# Patient Record
Sex: MALE | Race: WHITE | NOT HISPANIC OR LATINO | URBAN - METROPOLITAN AREA
[De-identification: names, ages, dates, MRNs, and addresses within clinical notes are randomized per-mention and may not be internally consistent; named-entity substitution may affect disease eponyms.]

---

## 2022-05-24 ENCOUNTER — OFFICE (OUTPATIENT)
Dept: URBAN - METROPOLITAN AREA CLINIC 10 | Facility: CLINIC | Age: 37
End: 2022-05-24
Payer: COMMERCIAL

## 2022-05-24 VITALS
DIASTOLIC BLOOD PRESSURE: 80 MMHG | WEIGHT: 200 LBS | SYSTOLIC BLOOD PRESSURE: 137 MMHG | OXYGEN SATURATION: 99 % | HEART RATE: 92 BPM | HEIGHT: 69 IN

## 2022-05-24 DIAGNOSIS — K92.1 MELENA: ICD-10-CM

## 2022-05-24 DIAGNOSIS — K60.2 ANAL FISSURE, UNSPECIFIED: ICD-10-CM

## 2022-05-24 DIAGNOSIS — R10.9 UNSPECIFIED ABDOMINAL PAIN: ICD-10-CM

## 2022-05-24 DIAGNOSIS — R19.7 DIARRHEA, UNSPECIFIED: ICD-10-CM

## 2022-05-24 PROCEDURE — 99204 OFFICE O/P NEW MOD 45 MIN: CPT | Performed by: PHYSICIAN ASSISTANT

## 2022-05-24 RX ORDER — HYOSCYAMINE SULFATE 0.12 MG/1
TABLET ORAL
Qty: 90 | Refills: 0 | Status: COMPLETED
Start: 2022-05-24 | End: 2022-06-21

## 2022-05-24 RX ORDER — TRAMADOL HYDROCHLORIDE 50 MG/1
TABLET, FILM COATED ORAL
Qty: 20 | Refills: 0 | Status: COMPLETED
Start: 2022-05-24 | End: 2022-06-21

## 2022-05-31 ENCOUNTER — INPATIENT HOSPITAL (OUTPATIENT)
Dept: URBAN - METROPOLITAN AREA HOSPITAL 93 | Facility: HOSPITAL | Age: 37
End: 2022-05-31
Payer: COMMERCIAL

## 2022-05-31 DIAGNOSIS — K52.9 NONINFECTIVE GASTROENTERITIS AND COLITIS, UNSPECIFIED: ICD-10-CM

## 2022-05-31 DIAGNOSIS — L40.50 ARTHROPATHIC PSORIASIS, UNSPECIFIED: ICD-10-CM

## 2022-05-31 DIAGNOSIS — R10.13 EPIGASTRIC PAIN: ICD-10-CM

## 2022-05-31 PROCEDURE — 99254 IP/OBS CNSLTJ NEW/EST MOD 60: CPT | Performed by: INTERNAL MEDICINE

## 2022-06-01 ENCOUNTER — INPATIENT HOSPITAL (OUTPATIENT)
Dept: URBAN - METROPOLITAN AREA HOSPITAL 93 | Facility: HOSPITAL | Age: 37
End: 2022-06-01
Payer: COMMERCIAL

## 2022-06-01 DIAGNOSIS — K52.9 NONINFECTIVE GASTROENTERITIS AND COLITIS, UNSPECIFIED: ICD-10-CM

## 2022-06-01 DIAGNOSIS — R10.13 EPIGASTRIC PAIN: ICD-10-CM

## 2022-06-01 PROCEDURE — 43239 EGD BIOPSY SINGLE/MULTIPLE: CPT | Performed by: INTERNAL MEDICINE

## 2022-06-01 PROCEDURE — 45331 SIGMOIDOSCOPY AND BIOPSY: CPT | Mod: 51 | Performed by: INTERNAL MEDICINE

## 2022-06-08 ENCOUNTER — INPATIENT HOSPITAL (OUTPATIENT)
Dept: URBAN - METROPOLITAN AREA HOSPITAL 93 | Facility: HOSPITAL | Age: 37
End: 2022-06-08
Payer: COMMERCIAL

## 2022-06-08 DIAGNOSIS — K60.2 ANAL FISSURE, UNSPECIFIED: ICD-10-CM

## 2022-06-08 DIAGNOSIS — K26.9 DUODENAL ULCER, UNSPECIFIED AS ACUTE OR CHRONIC, WITHOUT HEM: ICD-10-CM

## 2022-06-08 DIAGNOSIS — R10.13 EPIGASTRIC PAIN: ICD-10-CM

## 2022-06-08 DIAGNOSIS — K52.9 NONINFECTIVE GASTROENTERITIS AND COLITIS, UNSPECIFIED: ICD-10-CM

## 2022-06-08 PROCEDURE — 99222 1ST HOSP IP/OBS MODERATE 55: CPT | Performed by: INTERNAL MEDICINE

## 2022-06-09 ENCOUNTER — INPATIENT HOSPITAL (OUTPATIENT)
Dept: URBAN - METROPOLITAN AREA HOSPITAL 93 | Facility: HOSPITAL | Age: 37
End: 2022-06-09
Payer: COMMERCIAL

## 2022-06-09 DIAGNOSIS — K52.9 NONINFECTIVE GASTROENTERITIS AND COLITIS, UNSPECIFIED: ICD-10-CM

## 2022-06-09 DIAGNOSIS — K60.2 ANAL FISSURE, UNSPECIFIED: ICD-10-CM

## 2022-06-09 DIAGNOSIS — R10.13 EPIGASTRIC PAIN: ICD-10-CM

## 2022-06-09 DIAGNOSIS — K26.9 DUODENAL ULCER, UNSPECIFIED AS ACUTE OR CHRONIC, WITHOUT HEM: ICD-10-CM

## 2022-06-09 PROCEDURE — 99231 SBSQ HOSP IP/OBS SF/LOW 25: CPT | Performed by: INTERNAL MEDICINE

## 2022-06-10 ENCOUNTER — INPATIENT HOSPITAL (OUTPATIENT)
Dept: URBAN - METROPOLITAN AREA HOSPITAL 93 | Facility: HOSPITAL | Age: 37
End: 2022-06-10
Payer: COMMERCIAL

## 2022-06-10 DIAGNOSIS — K60.2 ANAL FISSURE, UNSPECIFIED: ICD-10-CM

## 2022-06-10 DIAGNOSIS — R10.13 EPIGASTRIC PAIN: ICD-10-CM

## 2022-06-10 DIAGNOSIS — K26.9 DUODENAL ULCER, UNSPECIFIED AS ACUTE OR CHRONIC, WITHOUT HEM: ICD-10-CM

## 2022-06-10 DIAGNOSIS — K52.9 NONINFECTIVE GASTROENTERITIS AND COLITIS, UNSPECIFIED: ICD-10-CM

## 2022-06-10 PROCEDURE — 99231 SBSQ HOSP IP/OBS SF/LOW 25: CPT | Performed by: INTERNAL MEDICINE

## 2022-06-11 ENCOUNTER — INPATIENT HOSPITAL (OUTPATIENT)
Dept: URBAN - METROPOLITAN AREA HOSPITAL 93 | Facility: HOSPITAL | Age: 37
End: 2022-06-11
Payer: COMMERCIAL

## 2022-06-11 DIAGNOSIS — K52.9 NONINFECTIVE GASTROENTERITIS AND COLITIS, UNSPECIFIED: ICD-10-CM

## 2022-06-11 DIAGNOSIS — K26.9 DUODENAL ULCER, UNSPECIFIED AS ACUTE OR CHRONIC, WITHOUT HEM: ICD-10-CM

## 2022-06-11 DIAGNOSIS — K60.2 ANAL FISSURE, UNSPECIFIED: ICD-10-CM

## 2022-06-11 DIAGNOSIS — R10.13 EPIGASTRIC PAIN: ICD-10-CM

## 2022-06-11 PROCEDURE — 99231 SBSQ HOSP IP/OBS SF/LOW 25: CPT | Performed by: INTERNAL MEDICINE

## 2022-06-12 ENCOUNTER — INPATIENT HOSPITAL (OUTPATIENT)
Dept: URBAN - METROPOLITAN AREA HOSPITAL 93 | Facility: HOSPITAL | Age: 37
End: 2022-06-12
Payer: COMMERCIAL

## 2022-06-12 DIAGNOSIS — R10.13 EPIGASTRIC PAIN: ICD-10-CM

## 2022-06-12 DIAGNOSIS — K60.2 ANAL FISSURE, UNSPECIFIED: ICD-10-CM

## 2022-06-12 DIAGNOSIS — K26.9 DUODENAL ULCER, UNSPECIFIED AS ACUTE OR CHRONIC, WITHOUT HEM: ICD-10-CM

## 2022-06-12 DIAGNOSIS — K52.9 NONINFECTIVE GASTROENTERITIS AND COLITIS, UNSPECIFIED: ICD-10-CM

## 2022-06-12 PROCEDURE — 99231 SBSQ HOSP IP/OBS SF/LOW 25: CPT | Performed by: INTERNAL MEDICINE

## 2022-06-13 ENCOUNTER — INPATIENT HOSPITAL (OUTPATIENT)
Dept: URBAN - METROPOLITAN AREA HOSPITAL 93 | Facility: HOSPITAL | Age: 37
End: 2022-06-13
Payer: COMMERCIAL

## 2022-06-13 DIAGNOSIS — K52.9 NONINFECTIVE GASTROENTERITIS AND COLITIS, UNSPECIFIED: ICD-10-CM

## 2022-06-13 DIAGNOSIS — K26.9 DUODENAL ULCER, UNSPECIFIED AS ACUTE OR CHRONIC, WITHOUT HEM: ICD-10-CM

## 2022-06-13 DIAGNOSIS — R10.13 EPIGASTRIC PAIN: ICD-10-CM

## 2022-06-13 DIAGNOSIS — L40.50 ARTHROPATHIC PSORIASIS, UNSPECIFIED: ICD-10-CM

## 2022-06-13 DIAGNOSIS — K60.2 ANAL FISSURE, UNSPECIFIED: ICD-10-CM

## 2022-06-13 PROCEDURE — 99231 SBSQ HOSP IP/OBS SF/LOW 25: CPT | Performed by: INTERNAL MEDICINE

## 2022-06-14 ENCOUNTER — INPATIENT HOSPITAL (OUTPATIENT)
Dept: URBAN - METROPOLITAN AREA HOSPITAL 93 | Facility: HOSPITAL | Age: 37
End: 2022-06-14
Payer: COMMERCIAL

## 2022-06-14 DIAGNOSIS — K60.2 ANAL FISSURE, UNSPECIFIED: ICD-10-CM

## 2022-06-14 DIAGNOSIS — K52.9 NONINFECTIVE GASTROENTERITIS AND COLITIS, UNSPECIFIED: ICD-10-CM

## 2022-06-14 DIAGNOSIS — K26.9 DUODENAL ULCER, UNSPECIFIED AS ACUTE OR CHRONIC, WITHOUT HEM: ICD-10-CM

## 2022-06-14 DIAGNOSIS — R10.13 EPIGASTRIC PAIN: ICD-10-CM

## 2022-06-14 PROCEDURE — 99231 SBSQ HOSP IP/OBS SF/LOW 25: CPT | Performed by: INTERNAL MEDICINE

## 2022-06-15 ENCOUNTER — INPATIENT HOSPITAL (OUTPATIENT)
Dept: URBAN - METROPOLITAN AREA HOSPITAL 93 | Facility: HOSPITAL | Age: 37
End: 2022-06-15
Payer: COMMERCIAL

## 2022-06-15 DIAGNOSIS — K26.9 DUODENAL ULCER, UNSPECIFIED AS ACUTE OR CHRONIC, WITHOUT HEM: ICD-10-CM

## 2022-06-15 DIAGNOSIS — R10.13 EPIGASTRIC PAIN: ICD-10-CM

## 2022-06-15 DIAGNOSIS — K52.9 NONINFECTIVE GASTROENTERITIS AND COLITIS, UNSPECIFIED: ICD-10-CM

## 2022-06-15 DIAGNOSIS — K60.2 ANAL FISSURE, UNSPECIFIED: ICD-10-CM

## 2022-06-15 DIAGNOSIS — L40.50 ARTHROPATHIC PSORIASIS, UNSPECIFIED: ICD-10-CM

## 2022-06-15 PROCEDURE — 99231 SBSQ HOSP IP/OBS SF/LOW 25: CPT | Performed by: INTERNAL MEDICINE

## 2022-06-21 ENCOUNTER — OFFICE (OUTPATIENT)
Dept: URBAN - METROPOLITAN AREA CLINIC 10 | Facility: CLINIC | Age: 37
End: 2022-06-21
Payer: COMMERCIAL

## 2022-06-21 VITALS
HEART RATE: 73 BPM | OXYGEN SATURATION: 98 % | HEIGHT: 69 IN | SYSTOLIC BLOOD PRESSURE: 126 MMHG | WEIGHT: 188 LBS | DIASTOLIC BLOOD PRESSURE: 79 MMHG

## 2022-06-21 DIAGNOSIS — K60.2 ANAL FISSURE, UNSPECIFIED: ICD-10-CM

## 2022-06-21 DIAGNOSIS — K50.10 CROHN'S DISEASE OF LARGE INTESTINE WITHOUT COMPLICATIONS: ICD-10-CM

## 2022-06-21 DIAGNOSIS — L40.50 ARTHROPATHIC PSORIASIS, UNSPECIFIED: ICD-10-CM

## 2022-06-21 DIAGNOSIS — K50.913 CROHN'S DISEASE, UNSPECIFIED, WITH FISTULA: ICD-10-CM

## 2022-06-21 LAB
CBC WITH DIFFERENTIAL/PLATELET: BASO (ABSOLUTE): 0.1 X10E3/UL (ref 0–0.2)
CBC WITH DIFFERENTIAL/PLATELET: BASOS: 0 %
CBC WITH DIFFERENTIAL/PLATELET: EOS (ABSOLUTE): 0 X10E3/UL (ref 0–0.4)
CBC WITH DIFFERENTIAL/PLATELET: EOS: 0 %
CBC WITH DIFFERENTIAL/PLATELET: HEMATOCRIT: 35.9 % — LOW (ref 37.5–51)
CBC WITH DIFFERENTIAL/PLATELET: HEMOGLOBIN: 11.5 G/DL — LOW (ref 13–17.7)
CBC WITH DIFFERENTIAL/PLATELET: IMMATURE GRANS (ABS): 0.1 X10E3/UL (ref 0–0.1)
CBC WITH DIFFERENTIAL/PLATELET: IMMATURE GRANULOCYTES: 1 %
CBC WITH DIFFERENTIAL/PLATELET: LYMPHS (ABSOLUTE): 1.5 X10E3/UL (ref 0.7–3.1)
CBC WITH DIFFERENTIAL/PLATELET: LYMPHS: 13 %
CBC WITH DIFFERENTIAL/PLATELET: MCH: 28.8 PG (ref 26.6–33)
CBC WITH DIFFERENTIAL/PLATELET: MCHC: 32 G/DL (ref 31.5–35.7)
CBC WITH DIFFERENTIAL/PLATELET: MCV: 90 FL (ref 79–97)
CBC WITH DIFFERENTIAL/PLATELET: MONOCYTES(ABSOLUTE): 0.6 X10E3/UL (ref 0.1–0.9)
CBC WITH DIFFERENTIAL/PLATELET: MONOCYTES: 5 %
CBC WITH DIFFERENTIAL/PLATELET: NEUTROPHILS (ABSOLUTE): 9.3 X10E3/UL — HIGH (ref 1.4–7)
CBC WITH DIFFERENTIAL/PLATELET: NEUTROPHILS: 81 %
CBC WITH DIFFERENTIAL/PLATELET: PLATELETS: 516 X10E3/UL — HIGH (ref 150–450)
CBC WITH DIFFERENTIAL/PLATELET: RBC: 3.99 X10E6/UL — LOW (ref 4.14–5.8)
CBC WITH DIFFERENTIAL/PLATELET: RDW: 12.2 % (ref 11.6–15.4)
CBC WITH DIFFERENTIAL/PLATELET: WBC: 11.6 X10E3/UL — HIGH (ref 3.4–10.8)
COMP. METABOLIC PANEL (14): A/G RATIO: 1.7 (ref 1.2–2.2)
COMP. METABOLIC PANEL (14): ALBUMIN: 4.3 G/DL (ref 4–5)
COMP. METABOLIC PANEL (14): ALKALINE PHOSPHATASE: 93 IU/L (ref 44–121)
COMP. METABOLIC PANEL (14): ALT (SGPT): 51 IU/L — HIGH (ref 0–44)
COMP. METABOLIC PANEL (14): AST (SGOT): 23 IU/L (ref 0–40)
COMP. METABOLIC PANEL (14): BILIRUBIN, TOTAL: 0.2 MG/DL (ref 0–1.2)
COMP. METABOLIC PANEL (14): BUN/CREATININE RATIO: 14 (ref 9–20)
COMP. METABOLIC PANEL (14): BUN: 10 MG/DL (ref 6–20)
COMP. METABOLIC PANEL (14): CALCIUM: 10 MG/DL (ref 8.7–10.2)
COMP. METABOLIC PANEL (14): CARBON DIOXIDE, TOTAL: 23 MMOL/L (ref 20–29)
COMP. METABOLIC PANEL (14): CHLORIDE: 103 MMOL/L (ref 96–106)
COMP. METABOLIC PANEL (14): CREATININE: 0.73 MG/DL — LOW (ref 0.76–1.27)
COMP. METABOLIC PANEL (14): EGFR: 119 ML/MIN/1.73 (ref 59–?)
COMP. METABOLIC PANEL (14): GLOBULIN, TOTAL: 2.5 G/DL (ref 1.5–4.5)
COMP. METABOLIC PANEL (14): GLUCOSE: 79 MG/DL (ref 65–99)
COMP. METABOLIC PANEL (14): POTASSIUM: 4.4 MMOL/L (ref 3.5–5.2)
COMP. METABOLIC PANEL (14): PROTEIN, TOTAL: 6.8 G/DL (ref 6–8.5)
COMP. METABOLIC PANEL (14): SODIUM: 139 MMOL/L (ref 134–144)
HBSAG SCREEN: NEGATIVE
HCV ANTIBODY: HEP C VIRUS AB: 0.1 S/CO RATIO (ref 0–0.9)
HEP A AB, TOTAL: NEGATIVE
HEP B SURFACE AB: HEP B SURFACE AB, QUAL: REACTIVE
QUANTIFERON-TB GOLD PLUS: NEGATIVE
QUANTIFERON-TB GOLD PLUS: QUANTIFERON CRITERIA: (no result)
QUANTIFERON-TB GOLD PLUS: QUANTIFERON INCUBATION: (no result)
QUANTIFERON-TB GOLD PLUS: QUANTIFERON MITOGEN VALUE: 8.89 IU/ML
QUANTIFERON-TB GOLD PLUS: QUANTIFERON NIL VALUE: 0.01 IU/ML
QUANTIFERON-TB GOLD PLUS: QUANTIFERON TB1 AG VALUE: 0 IU/ML
QUANTIFERON-TB GOLD PLUS: QUANTIFERON TB2 AG VALUE: 0.01 IU/ML

## 2022-06-21 PROCEDURE — 99214 OFFICE O/P EST MOD 30 MIN: CPT | Performed by: PHYSICIAN ASSISTANT

## 2022-06-21 RX ORDER — OXYCODONE HYDROCHLORIDE AND ACETAMINOPHEN 5; 325 MG/1; MG/1
TABLET ORAL
Qty: 15 | Refills: 0 | Status: COMPLETED
End: 2022-07-05

## 2022-06-21 NOTE — INTERFACERESULTNOTES
did discuss with patient on the telephone   When discussing biologic options.  He is in the process of getting Skyrizi

## 2022-07-05 ENCOUNTER — AMBULATORY SURGICAL CENTER (OUTPATIENT)
Dept: URBAN - METROPOLITAN AREA SURGERY 1 | Facility: SURGERY | Age: 37
End: 2022-07-05
Payer: COMMERCIAL

## 2022-07-05 ENCOUNTER — OFFICE (OUTPATIENT)
Dept: URBAN - METROPOLITAN AREA CLINIC 10 | Facility: CLINIC | Age: 37
End: 2022-07-05
Payer: COMMERCIAL

## 2022-07-05 VITALS
HEART RATE: 59 BPM | RESPIRATION RATE: 20 BRPM | SYSTOLIC BLOOD PRESSURE: 116 MMHG | DIASTOLIC BLOOD PRESSURE: 50 MMHG | HEART RATE: 70 BPM | OXYGEN SATURATION: 99 % | SYSTOLIC BLOOD PRESSURE: 112 MMHG | HEART RATE: 70 BPM | RESPIRATION RATE: 16 BRPM | TEMPERATURE: 97.3 F | RESPIRATION RATE: 21 BRPM | HEART RATE: 69 BPM | DIASTOLIC BLOOD PRESSURE: 73 MMHG | RESPIRATION RATE: 21 BRPM | OXYGEN SATURATION: 98 % | DIASTOLIC BLOOD PRESSURE: 73 MMHG | SYSTOLIC BLOOD PRESSURE: 133 MMHG | HEART RATE: 55 BPM | RESPIRATION RATE: 16 BRPM | RESPIRATION RATE: 20 BRPM | OXYGEN SATURATION: 97 % | HEART RATE: 59 BPM | HEIGHT: 69 IN | DIASTOLIC BLOOD PRESSURE: 81 MMHG | SYSTOLIC BLOOD PRESSURE: 151 MMHG | DIASTOLIC BLOOD PRESSURE: 81 MMHG | OXYGEN SATURATION: 99 % | RESPIRATION RATE: 18 BRPM | HEIGHT: 69 IN | DIASTOLIC BLOOD PRESSURE: 78 MMHG | DIASTOLIC BLOOD PRESSURE: 50 MMHG | DIASTOLIC BLOOD PRESSURE: 78 MMHG | RESPIRATION RATE: 20 BRPM | HEART RATE: 55 BPM | HEART RATE: 69 BPM | OXYGEN SATURATION: 97 % | HEART RATE: 69 BPM | HEIGHT: 69 IN | TEMPERATURE: 97.3 F | HEART RATE: 59 BPM | WEIGHT: 189 LBS | OXYGEN SATURATION: 98 % | RESPIRATION RATE: 16 BRPM | HEART RATE: 67 BPM | DIASTOLIC BLOOD PRESSURE: 62 MMHG | OXYGEN SATURATION: 98 % | HEART RATE: 67 BPM | HEART RATE: 55 BPM | SYSTOLIC BLOOD PRESSURE: 133 MMHG | DIASTOLIC BLOOD PRESSURE: 62 MMHG | RESPIRATION RATE: 18 BRPM | RESPIRATION RATE: 18 BRPM | WEIGHT: 189 LBS | SYSTOLIC BLOOD PRESSURE: 133 MMHG | HEART RATE: 65 BPM | HEART RATE: 67 BPM | RESPIRATION RATE: 21 BRPM | TEMPERATURE: 97.5 F | DIASTOLIC BLOOD PRESSURE: 81 MMHG | SYSTOLIC BLOOD PRESSURE: 151 MMHG | HEART RATE: 65 BPM | SYSTOLIC BLOOD PRESSURE: 112 MMHG | SYSTOLIC BLOOD PRESSURE: 116 MMHG | DIASTOLIC BLOOD PRESSURE: 50 MMHG | OXYGEN SATURATION: 97 % | DIASTOLIC BLOOD PRESSURE: 73 MMHG | SYSTOLIC BLOOD PRESSURE: 116 MMHG | SYSTOLIC BLOOD PRESSURE: 112 MMHG | DIASTOLIC BLOOD PRESSURE: 62 MMHG | TEMPERATURE: 97.3 F | TEMPERATURE: 97.5 F | WEIGHT: 189 LBS | SYSTOLIC BLOOD PRESSURE: 151 MMHG | DIASTOLIC BLOOD PRESSURE: 78 MMHG | HEART RATE: 65 BPM | TEMPERATURE: 97.5 F | HEART RATE: 70 BPM | OXYGEN SATURATION: 99 %

## 2022-07-05 DIAGNOSIS — K57.30 DIVERTICULOSIS OF LARGE INTESTINE WITHOUT PERFORATION OR ABS: ICD-10-CM

## 2022-07-05 DIAGNOSIS — K50.10 CROHN'S DISEASE OF LARGE INTESTINE WITHOUT COMPLICATIONS: ICD-10-CM

## 2022-07-05 PROBLEM — K63.89 OTHER SPECIFIED DISEASES OF INTESTINE: Status: ACTIVE | Noted: 2022-07-05

## 2022-07-05 PROBLEM — K52.89 INFLAMMATORY BOWEL DISEASE OF THE INTESTINE IF MORE PRECISE DIAGNOSIS OR DETERMINATION OF THE EXTENT/SEVERITY OF ACTIVITY OF DISEASE WILL INFLUENCE IMMEDIATE/FUTURE MANAGEMENT: Status: ACTIVE | Noted: 2022-07-05

## 2022-07-05 PROCEDURE — 45380 COLONOSCOPY AND BIOPSY: CPT | Performed by: INTERNAL MEDICINE

## 2022-07-05 PROCEDURE — 88342 IMHCHEM/IMCYTCHM 1ST ANTB: CPT | Performed by: INTERNAL MEDICINE

## 2022-07-05 PROCEDURE — 45380 COLONOSCOPY AND BIOPSY: CPT | Mod: SG | Performed by: INTERNAL MEDICINE

## 2022-07-05 PROCEDURE — 88305 TISSUE EXAM BY PATHOLOGIST: CPT | Performed by: INTERNAL MEDICINE

## 2022-07-05 NOTE — SERVICEHPINOTES
See recent outpatient visit note. br
br Mr. Crum is 36 years old. He is here for follow up of perianal Crohn's with Fistula.  He does have history of psoriatic arthritis previously taking Enbrel.  He has stopped that.  He was tried on Humira before that but had reactions so that was stopped.He was readmitted after 1st hospital admission with malaise and recurrent rectal pain. Exam under anesthesia revealed abscess which was drained by Dr. Coy and findings were typical for perianal Crohn's, seton was placed. His labs on 06/06/2022 showed white count 9.2, hematocrit 32 point platelet count 359. Plan was to coordinate with rheumatologist to change to Remicade and that perirectal abscess will need to be resolved before resuming bilateral therapy. In the meantime, patient was put on budesonide. CT of the pelvis with contrast on June 11, 2022 showed recurrent right perianal abscess which was more posterior midline compared to reference exam in measured 1.2 x 3.7 x 1.7 cm. No enterocutaneous component. Repeat lab 06/16/2022 showed WBC 8.6/hematocrit 30.6/MCV 89.5/platelets 423. Patient was taken back to the  noted to have pretty significant perianal Crohn's disease with new abscess and seton was placed after drainage of abscess. He was started on mesalamine also. His diarrhea improved as well as rectal pain.EGD and colonoscopy were performed at 1st admission. A nonbleeding ulcer was found in the duodenal bulb, 10 mm. Otherwise negative EGD. Sigmoidoscopy was performed which revealed anal fissure in the anal canal and erythema in the sigmoid colon which was biopsied showing lymphocytic colitis.He has been doing better lately. Has more formed stools but frequent stools. He took course of Augmentin. He is taking budesonide. He will be following up with Dr. Coy this week to discuss Seton removal. He does still have a good deal of rectal pain mainly in the morning although is better.

## 2022-07-22 ENCOUNTER — OFFICE (OUTPATIENT)
Dept: URBAN - METROPOLITAN AREA CLINIC 11 | Facility: CLINIC | Age: 37
End: 2022-07-22
Payer: COMMERCIAL

## 2022-07-22 VITALS
DIASTOLIC BLOOD PRESSURE: 77 MMHG | TEMPERATURE: 98.4 F | DIASTOLIC BLOOD PRESSURE: 76 MMHG | HEIGHT: 69 IN | HEART RATE: 69 BPM | RESPIRATION RATE: 18 BRPM | HEART RATE: 73 BPM | TEMPERATURE: 98.1 F | DIASTOLIC BLOOD PRESSURE: 89 MMHG | DIASTOLIC BLOOD PRESSURE: 83 MMHG | HEART RATE: 83 BPM | SYSTOLIC BLOOD PRESSURE: 119 MMHG | HEART RATE: 76 BPM | SYSTOLIC BLOOD PRESSURE: 123 MMHG | SYSTOLIC BLOOD PRESSURE: 128 MMHG | DIASTOLIC BLOOD PRESSURE: 96 MMHG | WEIGHT: 185.8 LBS | HEART RATE: 67 BPM | HEART RATE: 77 BPM | DIASTOLIC BLOOD PRESSURE: 84 MMHG | SYSTOLIC BLOOD PRESSURE: 139 MMHG | SYSTOLIC BLOOD PRESSURE: 130 MMHG

## 2022-07-22 DIAGNOSIS — K50.90 CROHN'S DISEASE, UNSPECIFIED, WITHOUT COMPLICATIONS: ICD-10-CM

## 2022-07-22 PROCEDURE — 96413 CHEMO IV INFUSION 1 HR: CPT | Performed by: INTERNAL MEDICINE

## 2022-07-22 PROCEDURE — 96415 CHEMO IV INFUSION ADDL HR: CPT | Performed by: INTERNAL MEDICINE

## 2022-07-22 NOTE — SERVICEHPINOTES
See follow up visit from  6/21/2022   .  brDate / Results of last TB test  6/21/2022   -   Negative  brLabs and/or Additional orders: CBC and CMP due in DecemberbrInsurance authorization:BCIL/AGUSTÍN Approved PA for new start [facility]-07/15/22-10/13/22 (SP)brPharmacy:  Specialty  brRate of Infusion:  Standard   
br   Infusion order placed on:  7/15/2022   
br

## 2022-07-22 NOTE — SERVICENOTES
Next Inflectra infusion is on 8/5/22 at 9:30
Patient observed for 15 minutes post infusion. 
Patient denies chest pain, shortness of breath or dizziness.  
Handout given and reviewed with patient.
Patient was instructed on common side effects.
Patient verbalized a complete understanding and has no questions.

## 2022-08-05 ENCOUNTER — OFFICE (OUTPATIENT)
Dept: URBAN - METROPOLITAN AREA CLINIC 11 | Facility: CLINIC | Age: 37
End: 2022-08-05
Payer: COMMERCIAL

## 2022-08-05 VITALS
SYSTOLIC BLOOD PRESSURE: 134 MMHG | SYSTOLIC BLOOD PRESSURE: 147 MMHG | HEIGHT: 69 IN | TEMPERATURE: 98.1 F | HEART RATE: 76 BPM | RESPIRATION RATE: 18 BRPM | HEART RATE: 64 BPM | SYSTOLIC BLOOD PRESSURE: 137 MMHG | TEMPERATURE: 98.2 F | DIASTOLIC BLOOD PRESSURE: 80 MMHG | DIASTOLIC BLOOD PRESSURE: 91 MMHG | DIASTOLIC BLOOD PRESSURE: 90 MMHG | HEART RATE: 70 BPM | DIASTOLIC BLOOD PRESSURE: 94 MMHG | HEART RATE: 73 BPM | WEIGHT: 191 LBS | DIASTOLIC BLOOD PRESSURE: 86 MMHG | DIASTOLIC BLOOD PRESSURE: 85 MMHG | HEART RATE: 66 BPM | SYSTOLIC BLOOD PRESSURE: 145 MMHG | SYSTOLIC BLOOD PRESSURE: 144 MMHG | SYSTOLIC BLOOD PRESSURE: 140 MMHG

## 2022-08-05 DIAGNOSIS — K50.90 CROHN'S DISEASE, UNSPECIFIED, WITHOUT COMPLICATIONS: ICD-10-CM

## 2022-08-05 PROCEDURE — 96413 CHEMO IV INFUSION 1 HR: CPT | Performed by: INTERNAL MEDICINE

## 2022-08-05 PROCEDURE — 96415 CHEMO IV INFUSION ADDL HR: CPT | Performed by: INTERNAL MEDICINE

## 2022-08-05 NOTE — SERVICEHPINOTES
See follow up visit from  6/21/2022   .  brDate / Results of last TB test  6/21/2022   -   Negative  brLabs and/or Additional orders: CBC &amp CMP due in December.brInsurance authorization: BCIL/AGUSTÍN Approved PA for new start [facility]-07/15/22-10/13/22.brPharmacy:  Specialty  brRate of Infusion:  Standard   
br   Infusion order placed on:  7/15/2022   
br

## 2022-09-23 ENCOUNTER — OFFICE (OUTPATIENT)
Dept: URBAN - METROPOLITAN AREA CLINIC 11 | Facility: CLINIC | Age: 37
End: 2022-09-23
Payer: COMMERCIAL

## 2022-09-23 VITALS
HEART RATE: 83 BPM | DIASTOLIC BLOOD PRESSURE: 80 MMHG | RESPIRATION RATE: 18 BRPM | HEART RATE: 82 BPM | SYSTOLIC BLOOD PRESSURE: 128 MMHG | DIASTOLIC BLOOD PRESSURE: 81 MMHG | TEMPERATURE: 97.3 F | SYSTOLIC BLOOD PRESSURE: 135 MMHG | DIASTOLIC BLOOD PRESSURE: 83 MMHG | HEIGHT: 69 IN | TEMPERATURE: 97.4 F | HEART RATE: 79 BPM | HEART RATE: 76 BPM | SYSTOLIC BLOOD PRESSURE: 132 MMHG | DIASTOLIC BLOOD PRESSURE: 86 MMHG

## 2022-09-23 DIAGNOSIS — K50.90 CROHN'S DISEASE, UNSPECIFIED, WITHOUT COMPLICATIONS: ICD-10-CM

## 2022-09-23 PROCEDURE — 96413 CHEMO IV INFUSION 1 HR: CPT | Performed by: INTERNAL MEDICINE

## 2022-09-23 NOTE — SERVICEHPINOTES
See follow up visit from  6/21/2022   .  brDate / Results of last TB test  6/21/2022   -   Negative  brLabs and/or Additional orders:CBC &amp CMP due in December:CMP due at third Skyrizi Infusion   brInsurance authorization: AGUSTÍN Approved PA for Skrizi IV loading doses-09/12/22-10/13/22 (SP) task sent for refill of Skyrizi brPharmacy:  Specialty  brRate of Infusion:  1 hour   
br   Infusion order placed on:  9/12/2022   
br

## 2022-09-23 NOTE — SERVICENOTES
Patient observed for 15 minutes post infusion. 
Patient denies chest pain, shortness of breath or dizziness.  
Handout given and reviewed with patient.
Patient was instructed on common side effects.
Patient verbalized a complete understanding and has no questions.

Your next Skyrizi infusion is on 10/21/22 at 0830am

Your next Skyrizi infusion is on 11/18/22  at 0830 am

## 2022-10-21 ENCOUNTER — OFFICE (OUTPATIENT)
Dept: URBAN - METROPOLITAN AREA CLINIC 11 | Facility: CLINIC | Age: 37
End: 2022-10-21
Payer: COMMERCIAL

## 2022-10-21 VITALS
HEIGHT: 69 IN | RESPIRATION RATE: 18 BRPM | DIASTOLIC BLOOD PRESSURE: 82 MMHG | TEMPERATURE: 98.1 F | SYSTOLIC BLOOD PRESSURE: 139 MMHG | SYSTOLIC BLOOD PRESSURE: 130 MMHG | DIASTOLIC BLOOD PRESSURE: 80 MMHG | SYSTOLIC BLOOD PRESSURE: 123 MMHG | HEART RATE: 81 BPM | SYSTOLIC BLOOD PRESSURE: 113 MMHG | DIASTOLIC BLOOD PRESSURE: 72 MMHG | DIASTOLIC BLOOD PRESSURE: 75 MMHG | HEART RATE: 83 BPM | HEART RATE: 89 BPM | TEMPERATURE: 98.2 F

## 2022-10-21 DIAGNOSIS — K50.90 CROHN'S DISEASE, UNSPECIFIED, WITHOUT COMPLICATIONS: ICD-10-CM

## 2022-10-21 PROCEDURE — 96413 CHEMO IV INFUSION 1 HR: CPT | Performed by: INTERNAL MEDICINE

## 2022-10-21 NOTE — SERVICEHPINOTES
See follow up visit from  6/21/2022   .  brDate / Results of last TB test  6/21/2022   -   Negative  brLabs and/or Additional orders: CBC &amp CMP due in December. CMP at next infusion.brInsurance authorization: AGUSTÍN Approved PA for Skrizi IV loading doses-09/12/22-10/13/22. Specialty medicine is here.brPharmacy:  Specialty  brRate of Infusion:  Standard   
br   Infusion order placed on:  9/12/2022   
br

## 2022-10-28 ENCOUNTER — OFFICE (OUTPATIENT)
Dept: URBAN - METROPOLITAN AREA CLINIC 10 | Facility: CLINIC | Age: 37
End: 2022-10-28
Payer: COMMERCIAL

## 2022-10-28 VITALS
DIASTOLIC BLOOD PRESSURE: 90 MMHG | OXYGEN SATURATION: 98 % | WEIGHT: 201 LBS | SYSTOLIC BLOOD PRESSURE: 127 MMHG | HEART RATE: 80 BPM | HEIGHT: 69 IN

## 2022-10-28 DIAGNOSIS — E34.9 ENDOCRINE DISORDER, UNSPECIFIED: ICD-10-CM

## 2022-10-28 DIAGNOSIS — R53.83 OTHER FATIGUE: ICD-10-CM

## 2022-10-28 DIAGNOSIS — L40.50 ARTHROPATHIC PSORIASIS, UNSPECIFIED: ICD-10-CM

## 2022-10-28 DIAGNOSIS — K50.10 CROHN'S DISEASE OF LARGE INTESTINE WITHOUT COMPLICATIONS: ICD-10-CM

## 2022-10-28 LAB
C-REACTIVE PROTEIN, QUANT: <1 MG/L
CBC WITH DIFFERENTIAL/PLATELET: BASO (ABSOLUTE): 0 X10E3/UL (ref 0–0.2)
CBC WITH DIFFERENTIAL/PLATELET: BASOS: 1 %
CBC WITH DIFFERENTIAL/PLATELET: EOS (ABSOLUTE): 0 X10E3/UL (ref 0–0.4)
CBC WITH DIFFERENTIAL/PLATELET: EOS: 0 %
CBC WITH DIFFERENTIAL/PLATELET: HEMATOCRIT: 44.8 % (ref 37.5–51)
CBC WITH DIFFERENTIAL/PLATELET: HEMOGLOBIN: 15.2 G/DL (ref 13–17.7)
CBC WITH DIFFERENTIAL/PLATELET: IMMATURE GRANS (ABS): 0 X10E3/UL (ref 0–0.1)
CBC WITH DIFFERENTIAL/PLATELET: IMMATURE GRANULOCYTES: 0 %
CBC WITH DIFFERENTIAL/PLATELET: LYMPHS (ABSOLUTE): 2.2 X10E3/UL (ref 0.7–3.1)
CBC WITH DIFFERENTIAL/PLATELET: LYMPHS: 32 %
CBC WITH DIFFERENTIAL/PLATELET: MCH: 31.5 PG (ref 26.6–33)
CBC WITH DIFFERENTIAL/PLATELET: MCHC: 33.9 G/DL (ref 31.5–35.7)
CBC WITH DIFFERENTIAL/PLATELET: MCV: 93 FL (ref 79–97)
CBC WITH DIFFERENTIAL/PLATELET: MONOCYTES(ABSOLUTE): 0.6 X10E3/UL (ref 0.1–0.9)
CBC WITH DIFFERENTIAL/PLATELET: MONOCYTES: 8 %
CBC WITH DIFFERENTIAL/PLATELET: NEUTROPHILS (ABSOLUTE): 4.2 X10E3/UL (ref 1.4–7)
CBC WITH DIFFERENTIAL/PLATELET: NEUTROPHILS: 59 %
CBC WITH DIFFERENTIAL/PLATELET: PLATELETS: 277 X10E3/UL (ref 150–450)
CBC WITH DIFFERENTIAL/PLATELET: RBC: 4.83 X10E6/UL (ref 4.14–5.8)
CBC WITH DIFFERENTIAL/PLATELET: RDW: 12.1 % (ref 11.6–15.4)
CBC WITH DIFFERENTIAL/PLATELET: WBC: 7.1 X10E3/UL (ref 3.4–10.8)
COMP. METABOLIC PANEL (14): A/G RATIO: 1.8 (ref 1.2–2.2)
COMP. METABOLIC PANEL (14): ALBUMIN: 4.4 G/DL (ref 4–5)
COMP. METABOLIC PANEL (14): ALKALINE PHOSPHATASE: 47 IU/L (ref 44–121)
COMP. METABOLIC PANEL (14): ALT (SGPT): 37 IU/L (ref 0–44)
COMP. METABOLIC PANEL (14): AST (SGOT): 24 IU/L (ref 0–40)
COMP. METABOLIC PANEL (14): BILIRUBIN, TOTAL: 0.6 MG/DL (ref 0–1.2)
COMP. METABOLIC PANEL (14): BUN/CREATININE RATIO: 11 (ref 9–20)
COMP. METABOLIC PANEL (14): BUN: 10 MG/DL (ref 6–20)
COMP. METABOLIC PANEL (14): CALCIUM: 9.4 MG/DL (ref 8.7–10.2)
COMP. METABOLIC PANEL (14): CARBON DIOXIDE, TOTAL: 23 MMOL/L (ref 20–29)
COMP. METABOLIC PANEL (14): CHLORIDE: 104 MMOL/L (ref 96–106)
COMP. METABOLIC PANEL (14): CREATININE: 0.93 MG/DL (ref 0.76–1.27)
COMP. METABOLIC PANEL (14): EGFR: 108 ML/MIN/1.73 (ref 59–?)
COMP. METABOLIC PANEL (14): GLOBULIN, TOTAL: 2.4 G/DL (ref 1.5–4.5)
COMP. METABOLIC PANEL (14): GLUCOSE: 82 MG/DL (ref 70–99)
COMP. METABOLIC PANEL (14): POTASSIUM: 4 MMOL/L (ref 3.5–5.2)
COMP. METABOLIC PANEL (14): PROTEIN, TOTAL: 6.8 G/DL (ref 6–8.5)
COMP. METABOLIC PANEL (14): SODIUM: 142 MMOL/L (ref 134–144)
Lab: 6.5 PG/ML — LOW (ref 8.7–25.1)
SEX HORM BINDING GLOB, SERUM: 24 NMOL/L (ref 16.5–55.9)
TESTOSTERONE: 334 NG/DL (ref 264–916)
VITAMIN B12 AND FOLATE: FOLATE (FOLIC ACID), SERUM: 12 NG/ML (ref 3–?)
VITAMIN B12 AND FOLATE: VITAMIN B12: 420 PG/ML (ref 232–1245)
VITAMIN D, 25-HYDROXY: 26.1 NG/ML — LOW (ref 30–100)

## 2022-10-28 PROCEDURE — 99214 OFFICE O/P EST MOD 30 MIN: CPT | Performed by: PHYSICIAN ASSISTANT

## 2022-11-02 LAB
C-REACTIVE PROTEIN, QUANT: <1 MG/L
CBC WITH DIFFERENTIAL/PLATELET: BASO (ABSOLUTE): 0 X10E3/UL (ref 0–0.2)
CBC WITH DIFFERENTIAL/PLATELET: BASOS: 1 %
CBC WITH DIFFERENTIAL/PLATELET: EOS (ABSOLUTE): 0 X10E3/UL (ref 0–0.4)
CBC WITH DIFFERENTIAL/PLATELET: EOS: 0 %
CBC WITH DIFFERENTIAL/PLATELET: HEMATOCRIT: 44.8 % (ref 37.5–51)
CBC WITH DIFFERENTIAL/PLATELET: HEMATOLOGY COMMENTS: (no result)
CBC WITH DIFFERENTIAL/PLATELET: HEMOGLOBIN: 15.2 G/DL (ref 13–17.7)
CBC WITH DIFFERENTIAL/PLATELET: IMMATURE CELLS: (no result)
CBC WITH DIFFERENTIAL/PLATELET: IMMATURE GRANS (ABS): 0 X10E3/UL (ref 0–0.1)
CBC WITH DIFFERENTIAL/PLATELET: IMMATURE GRANULOCYTES: 0 %
CBC WITH DIFFERENTIAL/PLATELET: LYMPHS (ABSOLUTE): 2.2 X10E3/UL (ref 0.7–3.1)
CBC WITH DIFFERENTIAL/PLATELET: LYMPHS: 32 %
CBC WITH DIFFERENTIAL/PLATELET: MCH: 31.5 PG (ref 26.6–33)
CBC WITH DIFFERENTIAL/PLATELET: MCHC: 33.9 G/DL (ref 31.5–35.7)
CBC WITH DIFFERENTIAL/PLATELET: MCV: 93 FL (ref 79–97)
CBC WITH DIFFERENTIAL/PLATELET: MONOCYTES(ABSOLUTE): 0.6 X10E3/UL (ref 0.1–0.9)
CBC WITH DIFFERENTIAL/PLATELET: MONOCYTES: 8 %
CBC WITH DIFFERENTIAL/PLATELET: NEUTROPHILS (ABSOLUTE): 4.2 X10E3/UL (ref 1.4–7)
CBC WITH DIFFERENTIAL/PLATELET: NEUTROPHILS: 59 %
CBC WITH DIFFERENTIAL/PLATELET: NRBC: (no result)
CBC WITH DIFFERENTIAL/PLATELET: PLATELETS: 277 X10E3/UL (ref 150–450)
CBC WITH DIFFERENTIAL/PLATELET: RBC: 4.83 X10E6/UL (ref 4.14–5.8)
CBC WITH DIFFERENTIAL/PLATELET: RDW: 12.1 % (ref 11.6–15.4)
CBC WITH DIFFERENTIAL/PLATELET: WBC: 7.1 X10E3/UL (ref 3.4–10.8)
COMP. METABOLIC PANEL (14): A/G RATIO: 1.8 (ref 1.2–2.2)
COMP. METABOLIC PANEL (14): ALBUMIN: 4.4 G/DL (ref 4–5)
COMP. METABOLIC PANEL (14): ALKALINE PHOSPHATASE: 47 IU/L (ref 44–121)
COMP. METABOLIC PANEL (14): ALT (SGPT): 37 IU/L (ref 0–44)
COMP. METABOLIC PANEL (14): AST (SGOT): 24 IU/L (ref 0–40)
COMP. METABOLIC PANEL (14): BILIRUBIN, TOTAL: 0.6 MG/DL (ref 0–1.2)
COMP. METABOLIC PANEL (14): BUN/CREATININE RATIO: 11 (ref 9–20)
COMP. METABOLIC PANEL (14): BUN: 10 MG/DL (ref 6–20)
COMP. METABOLIC PANEL (14): CALCIUM: 9.4 MG/DL (ref 8.7–10.2)
COMP. METABOLIC PANEL (14): CARBON DIOXIDE, TOTAL: 23 MMOL/L (ref 20–29)
COMP. METABOLIC PANEL (14): CHLORIDE: 104 MMOL/L (ref 96–106)
COMP. METABOLIC PANEL (14): CREATININE: 0.93 MG/DL (ref 0.76–1.27)
COMP. METABOLIC PANEL (14): EGFR: 108 ML/MIN/1.73 (ref 59–?)
COMP. METABOLIC PANEL (14): GLOBULIN, TOTAL: 2.4 G/DL (ref 1.5–4.5)
COMP. METABOLIC PANEL (14): GLUCOSE: 82 MG/DL (ref 70–99)
COMP. METABOLIC PANEL (14): POTASSIUM: 4 MMOL/L (ref 3.5–5.2)
COMP. METABOLIC PANEL (14): PROTEIN, TOTAL: 6.8 G/DL (ref 6–8.5)
COMP. METABOLIC PANEL (14): SODIUM: 142 MMOL/L (ref 134–144)
Lab: 6.5 PG/ML — LOW (ref 8.7–25.1)
SEX HORM BINDING GLOB, SERUM: 24 NMOL/L (ref 16.5–55.9)
TESTOSTERONE: 334 NG/DL (ref 264–916)
VITAMIN B12 AND FOLATE: FOLATE (FOLIC ACID), SERUM: 12 NG/ML (ref 3–?)
VITAMIN B12 AND FOLATE: VITAMIN B12: 420 PG/ML (ref 232–1245)
VITAMIN D, 25-HYDROXY: 26.1 NG/ML — LOW (ref 30–100)

## 2022-11-18 ENCOUNTER — OFFICE (OUTPATIENT)
Dept: URBAN - METROPOLITAN AREA CLINIC 11 | Facility: CLINIC | Age: 37
End: 2022-11-18
Payer: COMMERCIAL

## 2022-11-18 VITALS
RESPIRATION RATE: 18 BRPM | DIASTOLIC BLOOD PRESSURE: 78 MMHG | SYSTOLIC BLOOD PRESSURE: 138 MMHG | DIASTOLIC BLOOD PRESSURE: 90 MMHG | SYSTOLIC BLOOD PRESSURE: 145 MMHG | DIASTOLIC BLOOD PRESSURE: 85 MMHG | HEART RATE: 83 BPM | TEMPERATURE: 98.6 F | HEIGHT: 69 IN | DIASTOLIC BLOOD PRESSURE: 89 MMHG | SYSTOLIC BLOOD PRESSURE: 131 MMHG | HEART RATE: 69 BPM | TEMPERATURE: 97.3 F | HEART RATE: 65 BPM

## 2022-11-18 DIAGNOSIS — K50.90 CROHN'S DISEASE, UNSPECIFIED, WITHOUT COMPLICATIONS: ICD-10-CM

## 2022-11-18 PROCEDURE — 96413 CHEMO IV INFUSION 1 HR: CPT | Performed by: INTERNAL MEDICINE

## 2022-11-18 NOTE — SERVICEHPINOTES
See follow up visit from  10/28/2022   .  brDate / Results of last TB test  6/21/2022   -   Negative  brLabs and/or Additional orders: CBC &amp CMP in April:CMP drawn today brInsurance authorization: AGUSTÍN DUMONT for Alexandra IV-10/24/22-11/23/22 (SP)brPharmacy:  Specialty  brRate of Infusion:  1 hour   
br   Infusion order placed on:  9/12/2022   
br

## 2022-12-16 ENCOUNTER — OFFICE (OUTPATIENT)
Dept: URBAN - METROPOLITAN AREA CLINIC 11 | Facility: CLINIC | Age: 37
End: 2022-12-16

## 2022-12-16 VITALS
HEART RATE: 91 BPM | HEIGHT: 69 IN | SYSTOLIC BLOOD PRESSURE: 148 MMHG | DIASTOLIC BLOOD PRESSURE: 92 MMHG | OXYGEN SATURATION: 99 %

## 2022-12-16 DIAGNOSIS — K50.10 CROHN'S DISEASE OF LARGE INTESTINE WITHOUT COMPLICATIONS: ICD-10-CM

## 2022-12-16 PROCEDURE — 99214 OFFICE O/P EST MOD 30 MIN: CPT

## 2023-04-06 ENCOUNTER — OFFICE (OUTPATIENT)
Dept: URBAN - METROPOLITAN AREA CLINIC 10 | Facility: CLINIC | Age: 38
End: 2023-04-06

## 2023-04-06 VITALS
HEIGHT: 69 IN | HEART RATE: 79 BPM | WEIGHT: 204 LBS | OXYGEN SATURATION: 99 % | DIASTOLIC BLOOD PRESSURE: 85 MMHG | SYSTOLIC BLOOD PRESSURE: 126 MMHG

## 2023-04-06 DIAGNOSIS — K50.90 CROHN'S DISEASE, UNSPECIFIED, WITHOUT COMPLICATIONS: ICD-10-CM

## 2023-04-06 PROCEDURE — 99214 OFFICE O/P EST MOD 30 MIN: CPT | Performed by: INTERNAL MEDICINE

## 2023-06-22 ENCOUNTER — OFFICE (OUTPATIENT)
Dept: URBAN - METROPOLITAN AREA CLINIC 10 | Facility: CLINIC | Age: 38
End: 2023-06-22
Payer: COMMERCIAL

## 2023-06-22 VITALS
DIASTOLIC BLOOD PRESSURE: 96 MMHG | OXYGEN SATURATION: 99 % | HEART RATE: 73 BPM | HEIGHT: 69 IN | WEIGHT: 203 LBS | SYSTOLIC BLOOD PRESSURE: 142 MMHG

## 2023-06-22 DIAGNOSIS — L30.9 DERMATITIS, UNSPECIFIED: ICD-10-CM

## 2023-06-22 DIAGNOSIS — K50.90 CROHN'S DISEASE, UNSPECIFIED, WITHOUT COMPLICATIONS: ICD-10-CM

## 2023-06-22 DIAGNOSIS — L40.50 ARTHROPATHIC PSORIASIS, UNSPECIFIED: ICD-10-CM

## 2023-06-22 PROCEDURE — 99214 OFFICE O/P EST MOD 30 MIN: CPT | Performed by: PHYSICIAN ASSISTANT

## 2023-06-23 LAB
CBC WITH DIFFERENTIAL/PLATELET: BASO (ABSOLUTE): 0.1 X10E3/UL (ref 0–0.2)
CBC WITH DIFFERENTIAL/PLATELET: BASOS: 1 %
CBC WITH DIFFERENTIAL/PLATELET: EOS (ABSOLUTE): 0.2 X10E3/UL (ref 0–0.4)
CBC WITH DIFFERENTIAL/PLATELET: EOS: 3 %
CBC WITH DIFFERENTIAL/PLATELET: HEMATOCRIT: 43.1 % (ref 37.5–51)
CBC WITH DIFFERENTIAL/PLATELET: HEMOGLOBIN: 15.2 G/DL (ref 13–17.7)
CBC WITH DIFFERENTIAL/PLATELET: IMMATURE GRANS (ABS): 0 X10E3/UL (ref 0–0.1)
CBC WITH DIFFERENTIAL/PLATELET: IMMATURE GRANULOCYTES: 1 %
CBC WITH DIFFERENTIAL/PLATELET: LYMPHS (ABSOLUTE): 1.2 X10E3/UL (ref 0.7–3.1)
CBC WITH DIFFERENTIAL/PLATELET: LYMPHS: 25 %
CBC WITH DIFFERENTIAL/PLATELET: MCH: 31.9 PG (ref 26.6–33)
CBC WITH DIFFERENTIAL/PLATELET: MCHC: 35.3 G/DL (ref 31.5–35.7)
CBC WITH DIFFERENTIAL/PLATELET: MCV: 91 FL (ref 79–97)
CBC WITH DIFFERENTIAL/PLATELET: MONOCYTES(ABSOLUTE): 0.5 X10E3/UL (ref 0.1–0.9)
CBC WITH DIFFERENTIAL/PLATELET: MONOCYTES: 11 %
CBC WITH DIFFERENTIAL/PLATELET: NEUTROPHILS (ABSOLUTE): 2.9 X10E3/UL (ref 1.4–7)
CBC WITH DIFFERENTIAL/PLATELET: NEUTROPHILS: 59 %
CBC WITH DIFFERENTIAL/PLATELET: PLATELETS: 339 X10E3/UL (ref 150–450)
CBC WITH DIFFERENTIAL/PLATELET: RBC: 4.76 X10E6/UL (ref 4.14–5.8)
CBC WITH DIFFERENTIAL/PLATELET: RDW: 12.6 % (ref 11.6–15.4)
CBC WITH DIFFERENTIAL/PLATELET: WBC: 5 X10E3/UL (ref 3.4–10.8)
COMP. METABOLIC PANEL (14): A/G RATIO: 2.2 (ref 1.2–2.2)
COMP. METABOLIC PANEL (14): ALBUMIN: 4.8 G/DL (ref 4–5)
COMP. METABOLIC PANEL (14): ALKALINE PHOSPHATASE: 67 IU/L (ref 44–121)
COMP. METABOLIC PANEL (14): ALT (SGPT): 59 IU/L — HIGH (ref 0–44)
COMP. METABOLIC PANEL (14): AST (SGOT): 35 IU/L (ref 0–40)
COMP. METABOLIC PANEL (14): BILIRUBIN, TOTAL: 1 MG/DL (ref 0–1.2)
COMP. METABOLIC PANEL (14): BUN/CREATININE RATIO: 7 — LOW (ref 9–20)
COMP. METABOLIC PANEL (14): BUN: 7 MG/DL (ref 6–20)
COMP. METABOLIC PANEL (14): CALCIUM: 9.8 MG/DL (ref 8.7–10.2)
COMP. METABOLIC PANEL (14): CARBON DIOXIDE, TOTAL: 21 MMOL/L (ref 20–29)
COMP. METABOLIC PANEL (14): CHLORIDE: 105 MMOL/L (ref 96–106)
COMP. METABOLIC PANEL (14): CREATININE: 0.98 MG/DL (ref 0.76–1.27)
COMP. METABOLIC PANEL (14): EGFR: 101 ML/MIN/1.73 (ref 59–?)
COMP. METABOLIC PANEL (14): GLOBULIN, TOTAL: 2.2 G/DL (ref 1.5–4.5)
COMP. METABOLIC PANEL (14): GLUCOSE: 91 MG/DL (ref 70–99)
COMP. METABOLIC PANEL (14): POTASSIUM: 4.4 MMOL/L (ref 3.5–5.2)
COMP. METABOLIC PANEL (14): PROTEIN, TOTAL: 7 G/DL (ref 6–8.5)
COMP. METABOLIC PANEL (14): SODIUM: 141 MMOL/L (ref 134–144)

## 2023-08-03 ENCOUNTER — OFFICE (OUTPATIENT)
Dept: URBAN - METROPOLITAN AREA CLINIC 10 | Facility: CLINIC | Age: 38
End: 2023-08-03
Payer: COMMERCIAL

## 2023-08-03 VITALS
SYSTOLIC BLOOD PRESSURE: 144 MMHG | OXYGEN SATURATION: 96 % | HEART RATE: 93 BPM | DIASTOLIC BLOOD PRESSURE: 91 MMHG | HEIGHT: 69 IN | WEIGHT: 202 LBS

## 2023-08-03 DIAGNOSIS — L02.215 CUTANEOUS ABSCESS OF PERINEUM: ICD-10-CM

## 2023-08-03 DIAGNOSIS — L30.9 DERMATITIS, UNSPECIFIED: ICD-10-CM

## 2023-08-03 DIAGNOSIS — K50.90 CROHN'S DISEASE, UNSPECIFIED, WITHOUT COMPLICATIONS: ICD-10-CM

## 2023-08-03 PROCEDURE — 99214 OFFICE O/P EST MOD 30 MIN: CPT | Performed by: PHYSICIAN ASSISTANT

## 2023-08-03 RX ORDER — NYSTATIN 100000 [USP'U]/G
POWDER TOPICAL
Qty: 1 | Refills: 2 | Status: ACTIVE
Start: 2023-08-03

## 2023-08-03 RX ORDER — AMOXICILLIN AND CLAVULANATE POTASSIUM 875; 125 MG/1; MG/1
TABLET, FILM COATED ORAL
Qty: 10 | Refills: 0 | Status: COMPLETED
Start: 2023-08-03 | End: 2023-12-07

## 2023-08-04 LAB
C-REACTIVE PROTEIN, QUANT: <1 MG/L
CBC WITH DIFFERENTIAL/PLATELET: BASO (ABSOLUTE): 0.1 X10E3/UL (ref 0–0.2)
CBC WITH DIFFERENTIAL/PLATELET: BASOS: 1 %
CBC WITH DIFFERENTIAL/PLATELET: EOS (ABSOLUTE): 0.2 X10E3/UL (ref 0–0.4)
CBC WITH DIFFERENTIAL/PLATELET: EOS: 5 %
CBC WITH DIFFERENTIAL/PLATELET: HEMATOCRIT: 47.2 % (ref 37.5–51)
CBC WITH DIFFERENTIAL/PLATELET: HEMOGLOBIN: 16.2 G/DL (ref 13–17.7)
CBC WITH DIFFERENTIAL/PLATELET: IMMATURE GRANS (ABS): 0 X10E3/UL (ref 0–0.1)
CBC WITH DIFFERENTIAL/PLATELET: IMMATURE GRANULOCYTES: 0 %
CBC WITH DIFFERENTIAL/PLATELET: LYMPHS (ABSOLUTE): 1.4 X10E3/UL (ref 0.7–3.1)
CBC WITH DIFFERENTIAL/PLATELET: LYMPHS: 30 %
CBC WITH DIFFERENTIAL/PLATELET: MCH: 31.5 PG (ref 26.6–33)
CBC WITH DIFFERENTIAL/PLATELET: MCHC: 34.3 G/DL (ref 31.5–35.7)
CBC WITH DIFFERENTIAL/PLATELET: MCV: 92 FL (ref 79–97)
CBC WITH DIFFERENTIAL/PLATELET: MONOCYTES(ABSOLUTE): 0.4 X10E3/UL (ref 0.1–0.9)
CBC WITH DIFFERENTIAL/PLATELET: MONOCYTES: 9 %
CBC WITH DIFFERENTIAL/PLATELET: NEUTROPHILS (ABSOLUTE): 2.4 X10E3/UL (ref 1.4–7)
CBC WITH DIFFERENTIAL/PLATELET: NEUTROPHILS: 55 %
CBC WITH DIFFERENTIAL/PLATELET: PLATELETS: 262 X10E3/UL (ref 150–450)
CBC WITH DIFFERENTIAL/PLATELET: RBC: 5.15 X10E6/UL (ref 4.14–5.8)
CBC WITH DIFFERENTIAL/PLATELET: RDW: 12.8 % (ref 11.6–15.4)
CBC WITH DIFFERENTIAL/PLATELET: WBC: 4.5 X10E3/UL (ref 3.4–10.8)
COMP. METABOLIC PANEL (14): A/G RATIO: 2 (ref 1.2–2.2)
COMP. METABOLIC PANEL (14): ALBUMIN: 4.8 G/DL (ref 4.1–5.1)
COMP. METABOLIC PANEL (14): ALKALINE PHOSPHATASE: 56 IU/L (ref 44–121)
COMP. METABOLIC PANEL (14): ALT (SGPT): 35 IU/L (ref 0–44)
COMP. METABOLIC PANEL (14): AST (SGOT): 23 IU/L (ref 0–40)
COMP. METABOLIC PANEL (14): BILIRUBIN, TOTAL: 0.8 MG/DL (ref 0–1.2)
COMP. METABOLIC PANEL (14): BUN/CREATININE RATIO: 9 (ref 9–20)
COMP. METABOLIC PANEL (14): BUN: 9 MG/DL (ref 6–20)
COMP. METABOLIC PANEL (14): CALCIUM: 10 MG/DL (ref 8.7–10.2)
COMP. METABOLIC PANEL (14): CARBON DIOXIDE, TOTAL: 22 MMOL/L (ref 20–29)
COMP. METABOLIC PANEL (14): CHLORIDE: 106 MMOL/L (ref 96–106)
COMP. METABOLIC PANEL (14): CREATININE: 0.98 MG/DL (ref 0.76–1.27)
COMP. METABOLIC PANEL (14): EGFR: 101 ML/MIN/1.73 (ref 59–?)
COMP. METABOLIC PANEL (14): GLOBULIN, TOTAL: 2.4 G/DL (ref 1.5–4.5)
COMP. METABOLIC PANEL (14): GLUCOSE: 104 MG/DL — HIGH (ref 70–99)
COMP. METABOLIC PANEL (14): POTASSIUM: 5.5 MMOL/L — HIGH (ref 3.5–5.2)
COMP. METABOLIC PANEL (14): PROTEIN, TOTAL: 7.2 G/DL (ref 6–8.5)
COMP. METABOLIC PANEL (14): SODIUM: 141 MMOL/L (ref 134–144)

## 2023-12-07 ENCOUNTER — OFFICE (OUTPATIENT)
Dept: URBAN - METROPOLITAN AREA CLINIC 10 | Facility: CLINIC | Age: 38
End: 2023-12-07

## 2023-12-07 VITALS
OXYGEN SATURATION: 99 % | SYSTOLIC BLOOD PRESSURE: 142 MMHG | DIASTOLIC BLOOD PRESSURE: 92 MMHG | HEIGHT: 69 IN | HEART RATE: 97 BPM | WEIGHT: 210 LBS

## 2023-12-07 DIAGNOSIS — R05.3 CHRONIC COUGH: ICD-10-CM

## 2023-12-07 DIAGNOSIS — K50.90 CROHN'S DISEASE, UNSPECIFIED, WITHOUT COMPLICATIONS: ICD-10-CM

## 2023-12-07 PROCEDURE — 99214 OFFICE O/P EST MOD 30 MIN: CPT | Performed by: PHYSICIAN ASSISTANT

## 2023-12-07 RX ORDER — OMEPRAZOLE 40 MG/1
CAPSULE, DELAYED RELEASE ORAL
Qty: 30 | Refills: 1 | Status: ACTIVE
Start: 2023-12-07

## 2023-12-07 RX ORDER — NYSTATIN 100000 [USP'U]/G
POWDER TOPICAL
Qty: 1 | Refills: 2 | Status: ACTIVE
Start: 2023-08-03

## 2023-12-12 LAB
C DIFFICILE TOXINS A+B, EIA: NEGATIVE
C-REACTIVE PROTEIN, QUANT: 14 MG/L — HIGH (ref 0–10)
CALPROTECTIN, FECAL: 37 UG/G (ref 0–120)
CBC WITH DIFFERENTIAL/PLATELET: BASO (ABSOLUTE): 0.1 X10E3/UL (ref 0–0.2)
CBC WITH DIFFERENTIAL/PLATELET: BASOS: 1 %
CBC WITH DIFFERENTIAL/PLATELET: EOS (ABSOLUTE): 0.1 X10E3/UL (ref 0–0.4)
CBC WITH DIFFERENTIAL/PLATELET: EOS: 2 %
CBC WITH DIFFERENTIAL/PLATELET: HEMATOCRIT: 47.9 % (ref 37.5–51)
CBC WITH DIFFERENTIAL/PLATELET: HEMOGLOBIN: 16.8 G/DL (ref 13–17.7)
CBC WITH DIFFERENTIAL/PLATELET: IMMATURE GRANS (ABS): 0 X10E3/UL (ref 0–0.1)
CBC WITH DIFFERENTIAL/PLATELET: IMMATURE GRANULOCYTES: 0 %
CBC WITH DIFFERENTIAL/PLATELET: LYMPHS (ABSOLUTE): 1.3 X10E3/UL (ref 0.7–3.1)
CBC WITH DIFFERENTIAL/PLATELET: LYMPHS: 25 %
CBC WITH DIFFERENTIAL/PLATELET: MCH: 32.4 PG (ref 26.6–33)
CBC WITH DIFFERENTIAL/PLATELET: MCHC: 35.1 G/DL (ref 31.5–35.7)
CBC WITH DIFFERENTIAL/PLATELET: MCV: 92 FL (ref 79–97)
CBC WITH DIFFERENTIAL/PLATELET: MONOCYTES(ABSOLUTE): 0.4 X10E3/UL (ref 0.1–0.9)
CBC WITH DIFFERENTIAL/PLATELET: MONOCYTES: 8 %
CBC WITH DIFFERENTIAL/PLATELET: NEUTROPHILS (ABSOLUTE): 3.2 X10E3/UL (ref 1.4–7)
CBC WITH DIFFERENTIAL/PLATELET: NEUTROPHILS: 64 %
CBC WITH DIFFERENTIAL/PLATELET: PLATELETS: 281 X10E3/UL (ref 150–450)
CBC WITH DIFFERENTIAL/PLATELET: RBC: 5.19 X10E6/UL (ref 4.14–5.8)
CBC WITH DIFFERENTIAL/PLATELET: RDW: 12.6 % (ref 11.6–15.4)
CBC WITH DIFFERENTIAL/PLATELET: WBC: 5 X10E3/UL (ref 3.4–10.8)
COMP. METABOLIC PANEL (14): A/G RATIO: 1.7 (ref 1.2–2.2)
COMP. METABOLIC PANEL (14): ALBUMIN: 4.4 G/DL (ref 4.1–5.1)
COMP. METABOLIC PANEL (14): ALKALINE PHOSPHATASE: 44 IU/L (ref 44–121)
COMP. METABOLIC PANEL (14): ALT (SGPT): 28 IU/L (ref 0–44)
COMP. METABOLIC PANEL (14): AST (SGOT): 23 IU/L (ref 0–40)
COMP. METABOLIC PANEL (14): BILIRUBIN, TOTAL: 0.7 MG/DL (ref 0–1.2)
COMP. METABOLIC PANEL (14): BUN/CREATININE RATIO: 6 — LOW (ref 9–20)
COMP. METABOLIC PANEL (14): BUN: 6 MG/DL (ref 6–24)
COMP. METABOLIC PANEL (14): CALCIUM: 10.1 MG/DL (ref 8.7–10.2)
COMP. METABOLIC PANEL (14): CARBON DIOXIDE, TOTAL: 25 MMOL/L (ref 20–29)
COMP. METABOLIC PANEL (14): CHLORIDE: 102 MMOL/L (ref 96–106)
COMP. METABOLIC PANEL (14): CREATININE: 1.06 MG/DL (ref 0.76–1.27)
COMP. METABOLIC PANEL (14): EGFR: 91 ML/MIN/1.73 (ref 59–?)
COMP. METABOLIC PANEL (14): GLOBULIN, TOTAL: 2.6 G/DL (ref 1.5–4.5)
COMP. METABOLIC PANEL (14): GLUCOSE: 97 MG/DL (ref 70–99)
COMP. METABOLIC PANEL (14): POTASSIUM: 4.7 MMOL/L (ref 3.5–5.2)
COMP. METABOLIC PANEL (14): PROTEIN, TOTAL: 7 G/DL (ref 6–8.5)
COMP. METABOLIC PANEL (14): SODIUM: 140 MMOL/L (ref 134–144)
H PYLORI BREATH TEST: NEGATIVE
QUANTIFERON-TB GOLD PLUS: (no result)
QUANTIFERON-TB GOLD PLUS: QUANTIFERON INCUBATION: (no result)
REQUEST PROBLEM: (no result)

## 2024-03-07 ENCOUNTER — OFFICE (OUTPATIENT)
Dept: URBAN - METROPOLITAN AREA CLINIC 10 | Facility: CLINIC | Age: 39
End: 2024-03-07

## 2024-03-07 VITALS
WEIGHT: 220 LBS | HEART RATE: 98 BPM | OXYGEN SATURATION: 97 % | SYSTOLIC BLOOD PRESSURE: 146 MMHG | HEIGHT: 69 IN | DIASTOLIC BLOOD PRESSURE: 101 MMHG

## 2024-03-07 DIAGNOSIS — K50.90 CROHN'S DISEASE, UNSPECIFIED, WITHOUT COMPLICATIONS: ICD-10-CM

## 2024-03-07 DIAGNOSIS — R05.3 CHRONIC COUGH: ICD-10-CM

## 2024-03-07 DIAGNOSIS — L30.9 DERMATITIS, UNSPECIFIED: ICD-10-CM

## 2024-03-07 PROCEDURE — 99213 OFFICE O/P EST LOW 20 MIN: CPT | Performed by: PHYSICIAN ASSISTANT

## 2025-07-07 ENCOUNTER — OFFICE (OUTPATIENT)
Dept: URBAN - METROPOLITAN AREA CLINIC 10 | Facility: CLINIC | Age: 40
End: 2025-07-07
Payer: COMMERCIAL

## 2025-07-07 VITALS
WEIGHT: 223 LBS | OXYGEN SATURATION: 97 % | SYSTOLIC BLOOD PRESSURE: 160 MMHG | DIASTOLIC BLOOD PRESSURE: 80 MMHG | SYSTOLIC BLOOD PRESSURE: 157 MMHG | HEIGHT: 69 IN | HEART RATE: 106 BPM | DIASTOLIC BLOOD PRESSURE: 97 MMHG

## 2025-07-07 DIAGNOSIS — Z87.898 PERSONAL HISTORY OF OTHER SPECIFIED CONDITIONS: ICD-10-CM

## 2025-07-07 DIAGNOSIS — K50.90 CROHN'S DISEASE, UNSPECIFIED, WITHOUT COMPLICATIONS: ICD-10-CM

## 2025-07-07 DIAGNOSIS — E34.9 ENDOCRINE DISORDER, UNSPECIFIED: ICD-10-CM

## 2025-07-07 PROCEDURE — 99214 OFFICE O/P EST MOD 30 MIN: CPT | Performed by: PHYSICIAN ASSISTANT

## 2025-07-07 RX ORDER — POLYETHYLENE GLYCOL 3350, SODIUM SULFATE, POTASSIUM CHLORIDE, MAGNESIUM SULFATE, AND SODIUM CHLORIDE FOR ORAL SOLUTION 178.7-7.3G
KIT ORAL
Qty: 1 | Refills: 0 | Status: ACTIVE
Start: 2025-07-07